# Patient Record
Sex: MALE
[De-identification: names, ages, dates, MRNs, and addresses within clinical notes are randomized per-mention and may not be internally consistent; named-entity substitution may affect disease eponyms.]

---

## 2021-05-19 PROBLEM — Z00.00 ENCOUNTER FOR PREVENTIVE HEALTH EXAMINATION: Status: ACTIVE | Noted: 2021-05-19

## 2021-05-20 ENCOUNTER — APPOINTMENT (OUTPATIENT)
Dept: NEUROSURGERY | Facility: CLINIC | Age: 66
End: 2021-05-20
Payer: COMMERCIAL

## 2021-05-20 DIAGNOSIS — Z86.79 PERSONAL HISTORY OF OTHER DISEASES OF THE CIRCULATORY SYSTEM: ICD-10-CM

## 2021-05-20 DIAGNOSIS — E66.3 OVERWEIGHT: ICD-10-CM

## 2021-05-20 DIAGNOSIS — I67.1 CEREBRAL ANEURYSM, NONRUPTURED: ICD-10-CM

## 2021-05-20 PROCEDURE — 99443: CPT | Mod: 95

## 2021-05-26 PROBLEM — E66.3 OVERWEIGHT: Status: RESOLVED | Noted: 2021-05-26 | Resolved: 2021-05-26

## 2021-05-26 PROBLEM — Z86.79 HISTORY OF HYPERTENSION: Status: RESOLVED | Noted: 2021-05-26 | Resolved: 2021-05-26

## 2021-05-26 PROBLEM — I67.1 ANEURYSM, CEREBRAL: Status: ACTIVE | Noted: 2021-05-26

## 2021-05-26 RX ORDER — ASPIRIN 325 MG/1
325 TABLET, FILM COATED ORAL
Refills: 0 | Status: ACTIVE | COMMUNITY

## 2021-05-26 RX ORDER — AMLODIPINE BESYLATE 10 MG/1
10 TABLET ORAL
Refills: 0 | Status: ACTIVE | COMMUNITY

## 2021-05-26 RX ORDER — LOSARTAN POTASSIUM 50 MG/1
50 TABLET, FILM COATED ORAL
Refills: 0 | Status: ACTIVE | COMMUNITY

## 2021-05-26 RX ORDER — CLOPIDOGREL 75 MG/1
75 TABLET, FILM COATED ORAL
Refills: 0 | Status: ACTIVE | COMMUNITY

## 2021-05-26 NOTE — REASON FOR VISIT
[New Patient Visit] : a new patient visit [Home] : at home, [unfilled] , at the time of the visit. [Medical Office: ([facility])___] : at the medical office located in  [Spouse] : spouse

## 2021-05-26 NOTE — DATA REVIEWED
[de-identified] : MRI BRAIN outside imaging [de-identified] : cerebral angiogram outsid e imaging

## 2021-05-26 NOTE — HISTORY OF PRESENT ILLNESS
[de-identified] : Julius Cook is a 66yr old male on thephone for a new patient visit. He has past medical history of hypertension and obesity. On March 8, 2021 he woke up with slurred speech, vomiting, nausea, feeling off balance, right arm and leg tingling. He went to the emergency and underwent imaging, imaging incidentally noted a cerebral aneurysm. these symptoms resolved after a few days. He was advised to follow up outpatient with neurosurgery to address the cerebral aneurysm. He underwent formal cerebral angiography in [facility] where he resides by Dr. Dayron Mak. 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  He has no family history of cerebral aneruysm and no social history of smoking.  Today he feels well denies any motor sensory speech visual abnormalities. \par \par PCP: DR. Tyson Tello

## 2021-05-26 NOTE — ASSESSMENT
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 Reviewed MRI brain and discussed with patient the aneurysm is pressing on the brainstem\par Educated patient on signs and symptoms of subarachnoid hemorrhage and should he have sudden onset worst headache of his life he must seek medical attention immediately. \par Discussed with patient the risks and benefits of treating this aneurysm \par Treatment recommended would be endovascular embolization with flow diversion. About 5-15%risk of stroke. \par Recommend remaining on aspirin and plavix now \par MRA brain non con [facility] when he comes to [place] pre op\par Patient will discuss options with his wife and should they wish to proceed with treatment they will call our office.